# Patient Record
Sex: FEMALE | Race: WHITE | NOT HISPANIC OR LATINO | URBAN - METROPOLITAN AREA
[De-identification: names, ages, dates, MRNs, and addresses within clinical notes are randomized per-mention and may not be internally consistent; named-entity substitution may affect disease eponyms.]

---

## 2020-07-10 ENCOUNTER — OUTPATIENT (OUTPATIENT)
Dept: OUTPATIENT SERVICES | Facility: HOSPITAL | Age: 55
LOS: 1 days | End: 2020-07-10
Payer: COMMERCIAL

## 2020-07-10 VITALS — HEIGHT: 65 IN | WEIGHT: 145.06 LBS

## 2020-07-10 DIAGNOSIS — M65.311 TRIGGER THUMB, RIGHT THUMB: ICD-10-CM

## 2020-07-10 DIAGNOSIS — Z01.818 ENCOUNTER FOR OTHER PREPROCEDURAL EXAMINATION: ICD-10-CM

## 2020-07-10 PROCEDURE — G0463: CPT

## 2020-07-10 NOTE — H&P PST ADULT - PAIN SCALE PREFERRED, PROFILE
Patient called back regarding refill for ambien.Told patient we do not have it on her medication list on current or historical.She said that it was prescribed for her 2 months ago and it should be on her medication list. Will forward to prescribing provider.   none

## 2020-07-10 NOTE — H&P PST ADULT - ADDITIONAL PE
As per current COVID-19 protocol physical exam to be done in holding room  prior to procedure on 7/15/2020

## 2020-07-10 NOTE — H&P PST ADULT - ASSESSMENT
55 year old female with Trigger Thumb, RIGHT Thumb - Scheduled for Release RIGHT Trigger Thumb with Dr Michael Waer on 7/15/2020. 55 year old female with Trigger Thumb, RIGHT Thumb, c/o of numbness of the right thumb - Scheduled for Release RIGHT Trigger Thumb with Dr Michael Ware on 7/15/2020.  H/O obtained over the phone due to COVI protocol. Physical to be completed on the day of procedure.

## 2020-07-10 NOTE — H&P PST ADULT - NSICDXPASTMEDICALHX_GEN_ALL_CORE_FT
PAST MEDICAL HISTORY:  Trigger thumb, right thumb PAST MEDICAL HISTORY:  Acid reflux     Trigger thumb, right thumb

## 2020-07-10 NOTE — H&P PST ADULT - SOURCE OF INFORMATION, PROFILE
Due to current COVID-19 protocol PST interview obtained via telephone - pt will be seen in the holding room for physical exam prior to procedure on 7/15/2020/patient

## 2020-07-10 NOTE — H&P PST ADULT - HISTORY OF PRESENT ILLNESS
55 year old female - Trigger Thumb, RIGHT Thumb - Scheduled for Release RIGHT Trigger Thumb with Dr Michael Ware on 7/15/2020      OF NOTE: Due to current COVID-19 protocol pts PST history obtained via telephone. Pt will be seen in the holding room for physical exam prior to procedure on 7/15/2020 for update H&P. 55 year old female - Trigger Thumb, RIGHT Thumb  for few months, seen by Dr ware,  Scheduled for Release RIGHT Trigger Thumb with Dr Michael Ware on 7/15/2020      OF NOTE: Due to current COVID-19 protocol pts PST history obtained via telephone. Pt will be seen in the holding room for physical exam prior to procedure on 7/15/2020 for update H&P. 55 year old female - Trigger Thumb, RIGHT Thumb, started getting locked sinc and numbness at times since Oct 2019, seen by Dr ware,  Scheduled for Release RIGHT Trigger Thumb with Dr Michael Ware on 7/15/2020      OF NOTE: Due to current COVID-19 protocol pts PST history obtained via telephone. Pt will be seen in the holding room for physical exam prior to procedure on 7/15/2020 for update H&P.

## 2020-07-10 NOTE — H&P PST ADULT - NSICDXPROBLEM_GEN_ALL_CORE_FT
PROBLEM DIAGNOSES  Problem: Trigger thumb, right thumb  Assessment and Plan: PROBLEM DIAGNOSES  Problem: Trigger thumb, right thumb  Assessment and Plan: Scheduled for Release RIGHT Trigger Thumb with Dr Michael Ware on 7/15/2020.   labs done with the surgeon.  Pre op instructions:  Hold OTC supplements. Medications reviewed for the week and morning of surgery.  NPO after 12 mn to the morning of surgery.  Shower in the morning with antibacterial soap as instructed.  Patient verbalized understanding.

## 2020-07-13 ENCOUNTER — OUTPATIENT (OUTPATIENT)
Dept: OUTPATIENT SERVICES | Facility: HOSPITAL | Age: 55
LOS: 1 days | End: 2020-07-13
Payer: COMMERCIAL

## 2020-07-13 DIAGNOSIS — Z11.59 ENCOUNTER FOR SCREENING FOR OTHER VIRAL DISEASES: ICD-10-CM

## 2020-07-13 DIAGNOSIS — Z98.890 OTHER SPECIFIED POSTPROCEDURAL STATES: Chronic | ICD-10-CM

## 2020-07-13 PROCEDURE — U0003: CPT

## 2020-07-14 ENCOUNTER — TRANSCRIPTION ENCOUNTER (OUTPATIENT)
Age: 55
End: 2020-07-14

## 2020-07-14 LAB — SARS-COV-2 RNA SPEC QL NAA+PROBE: SIGNIFICANT CHANGE UP

## 2020-07-15 ENCOUNTER — OUTPATIENT (OUTPATIENT)
Dept: OUTPATIENT SERVICES | Facility: HOSPITAL | Age: 55
LOS: 1 days | End: 2020-07-15
Payer: COMMERCIAL

## 2020-07-15 VITALS
RESPIRATION RATE: 17 BRPM | DIASTOLIC BLOOD PRESSURE: 80 MMHG | WEIGHT: 145.06 LBS | HEART RATE: 69 BPM | OXYGEN SATURATION: 100 % | HEIGHT: 65 IN | SYSTOLIC BLOOD PRESSURE: 142 MMHG | TEMPERATURE: 98 F

## 2020-07-15 VITALS — TEMPERATURE: 99 F

## 2020-07-15 DIAGNOSIS — Z01.818 ENCOUNTER FOR OTHER PREPROCEDURAL EXAMINATION: ICD-10-CM

## 2020-07-15 DIAGNOSIS — M65.311 TRIGGER THUMB, RIGHT THUMB: ICD-10-CM

## 2020-07-15 DIAGNOSIS — Z98.890 OTHER SPECIFIED POSTPROCEDURAL STATES: Chronic | ICD-10-CM

## 2020-07-15 LAB
HCT VFR BLD CALC: 40.2 % — SIGNIFICANT CHANGE UP (ref 34.5–45)
HGB BLD-MCNC: 13.6 G/DL — SIGNIFICANT CHANGE UP (ref 11.5–15.5)
MCHC RBC-ENTMCNC: 30.9 PG — SIGNIFICANT CHANGE UP (ref 27–34)
MCHC RBC-ENTMCNC: 33.8 GM/DL — SIGNIFICANT CHANGE UP (ref 32–36)
MCV RBC AUTO: 91.4 FL — SIGNIFICANT CHANGE UP (ref 80–100)
NRBC # BLD: 0 /100 WBCS — SIGNIFICANT CHANGE UP (ref 0–0)
PLATELET # BLD AUTO: 206 K/UL — SIGNIFICANT CHANGE UP (ref 150–400)
RBC # BLD: 4.4 M/UL — SIGNIFICANT CHANGE UP (ref 3.8–5.2)
RBC # FLD: 11.7 % — SIGNIFICANT CHANGE UP (ref 10.3–14.5)
WBC # BLD: 4.96 K/UL — SIGNIFICANT CHANGE UP (ref 3.8–10.5)
WBC # FLD AUTO: 4.96 K/UL — SIGNIFICANT CHANGE UP (ref 3.8–10.5)

## 2020-07-15 PROCEDURE — 26055 INCISE FINGER TENDON SHEATH: CPT | Mod: F5

## 2020-07-15 PROCEDURE — 36415 COLL VENOUS BLD VENIPUNCTURE: CPT

## 2020-07-15 PROCEDURE — 85027 COMPLETE CBC AUTOMATED: CPT

## 2020-07-15 RX ORDER — FENTANYL CITRATE 50 UG/ML
25 INJECTION INTRAVENOUS
Refills: 0 | Status: DISCONTINUED | OUTPATIENT
Start: 2020-07-15 | End: 2020-07-15

## 2020-07-15 RX ORDER — OXYCODONE AND ACETAMINOPHEN 5; 325 MG/1; MG/1
1 TABLET ORAL
Qty: 12 | Refills: 0
Start: 2020-07-15 | End: 2020-07-16

## 2020-07-15 RX ORDER — CEFAZOLIN SODIUM 1 G
2000 VIAL (EA) INJECTION ONCE
Refills: 0 | Status: COMPLETED | OUTPATIENT
Start: 2020-07-15 | End: 2020-07-15

## 2020-07-15 RX ORDER — ONDANSETRON 8 MG/1
4 TABLET, FILM COATED ORAL ONCE
Refills: 0 | Status: DISCONTINUED | OUTPATIENT
Start: 2020-07-15 | End: 2020-07-15

## 2020-07-15 RX ORDER — SODIUM CHLORIDE 9 MG/ML
1000 INJECTION, SOLUTION INTRAVENOUS
Refills: 0 | Status: DISCONTINUED | OUTPATIENT
Start: 2020-07-15 | End: 2020-07-15

## 2020-07-15 RX ORDER — ESOMEPRAZOLE MAGNESIUM 40 MG/1
1 CAPSULE, DELAYED RELEASE ORAL
Qty: 0 | Refills: 0 | DISCHARGE

## 2020-07-15 RX ORDER — ONDANSETRON 8 MG/1
1 TABLET, FILM COATED ORAL
Qty: 10 | Refills: 0
Start: 2020-07-15 | End: 2020-07-17

## 2020-07-15 RX ADMIN — SODIUM CHLORIDE 50 MILLILITER(S): 9 INJECTION, SOLUTION INTRAVENOUS at 13:03

## 2020-07-15 NOTE — ASU DISCHARGE PLAN (ADULT/PEDIATRIC) - CARE PROVIDER_API CALL
Michael Ware  ORTHOPAEDIC SURGERY  [address] [place] [address]  Phone: [phone]  Fax: [phone]  Follow Up Time:

## 2020-07-15 NOTE — ASU DISCHARGE PLAN (ADULT/PEDIATRIC) - ASU DC SPECIAL INSTRUCTIONSFT
1. Keep bandage on for 7 days. Then you can remove and get it wet. Otherwise cover hand with plastic bag for showering.    2. If you have a splint on, keep it on until you see Dr. Ware in the office. Do not get the splint wet because it will break down and it is not good for your skin.    3. Elevate hand as much as possible and wiggle fingers as much as you can, as often as you think of it (if you are watching TV every commercial wiggle 10 times, or reading a book wiggle 10 times after every 5 pages read). The exception is if you have a splint you will not be able to wiggle the affected digit. Try to wiggle the free ones though.    4. Walk plenty, no sitting around.    5. See Dr. Ware in the office in about 7-10 days. Call to schedule. You will have your wound checked then, any sutures will be removed, and you splint will be changed if you have one on.

## 2020-12-18 NOTE — H&P PST ADULT - PRESSURE ULCER(S)
no Rhombic Flap Text: The defect edges were debeveled with a #15 scalpel blade.  Given the location of the defect and the proximity to free margins a rhombic flap was deemed most appropriate.  Using a sterile surgical marker, an appropriate rhombic flap was drawn incorporating the defect.    The area thus outlined was incised deep to adipose tissue with a #15 scalpel blade.  The skin margins were undermined to an appropriate distance in all directions utilizing iris scissors.

## 2022-03-14 PROBLEM — M65.311 TRIGGER THUMB, RIGHT THUMB: Chronic | Status: ACTIVE | Noted: 2020-07-10

## 2022-03-14 PROBLEM — K21.9 GASTRO-ESOPHAGEAL REFLUX DISEASE WITHOUT ESOPHAGITIS: Chronic | Status: ACTIVE | Noted: 2020-07-13

## 2022-03-15 ENCOUNTER — APPOINTMENT (OUTPATIENT)
Dept: CT IMAGING | Facility: CLINIC | Age: 57
End: 2022-03-15
Payer: COMMERCIAL

## 2022-03-15 PROCEDURE — 74177 CT ABD & PELVIS W/CONTRAST: CPT
